# Patient Record
Sex: MALE | Race: WHITE | Employment: FULL TIME | URBAN - METROPOLITAN AREA
[De-identification: names, ages, dates, MRNs, and addresses within clinical notes are randomized per-mention and may not be internally consistent; named-entity substitution may affect disease eponyms.]

---

## 2017-02-04 ENCOUNTER — OFFICE VISIT (OUTPATIENT)
Dept: INTERNAL MEDICINE CLINIC | Facility: CLINIC | Age: 57
End: 2017-02-04

## 2017-02-04 VITALS
BODY MASS INDEX: 23.61 KG/M2 | WEIGHT: 182 LBS | HEIGHT: 73.5 IN | SYSTOLIC BLOOD PRESSURE: 118 MMHG | RESPIRATION RATE: 16 BRPM | TEMPERATURE: 98 F | HEART RATE: 78 BPM | DIASTOLIC BLOOD PRESSURE: 76 MMHG

## 2017-02-04 DIAGNOSIS — Z00.00 WELL ADULT EXAM: Primary | ICD-10-CM

## 2017-02-04 DIAGNOSIS — Z00.00 LABORATORY EXAM ORDERED AS PART OF ROUTINE GENERAL MEDICAL EXAMINATION: ICD-10-CM

## 2017-02-04 PROCEDURE — 99396 PREV VISIT EST AGE 40-64: CPT | Performed by: FAMILY MEDICINE

## 2017-02-04 NOTE — PROGRESS NOTES
HPI:    Patient ID: Heidi Pereira is a 55year old male.     HPI  Heidi Pereira is a 55year old male who presents for a complete physical exam.   HPI:       Wt Readings from Last 6 Encounters:  02/04/17 : 182 lb  10/14/15 : 179 lb  08/11/15 : 179 Alcohol Use: Yes           0.0 oz/week       0 Standard drinks or equivalent per week     . Exercise: walking.   Diet: watches minimally     REVIEW OF SYSTEMS:   GENERAL: feels well otherwise  SKIN: denies any unusual skin lesions  EYES:denies blurred these issues and agrees to the plan. The patient is asked to return for CPX in 1yr.     Review of Systems           Current Outpatient Prescriptions:  SIMVASTATIN 20 MG Oral Tab Take 1 tablet by mouth  nightly Disp: 90 tablet Rfl: 0     Allergies:No Known

## 2017-02-05 LAB
ABSOLUTE BASOPHILS: 17 CELLS/UL (ref 0–200)
ABSOLUTE EOSINOPHILS: 149 CELLS/UL (ref 15–500)
ABSOLUTE LYMPHOCYTES: 1485 CELLS/UL (ref 850–3900)
ABSOLUTE MONOCYTES: 495 CELLS/UL (ref 200–950)
ABSOLUTE NEUTROPHILS: 3355 CELLS/UL (ref 1500–7800)
ALBUMIN/GLOBULIN RATIO: 1.8 (CALC) (ref 1–2.5)
ALBUMIN: 4.4 G/DL (ref 3.6–5.1)
ALKALINE PHOSPHATASE: 39 U/L (ref 40–115)
ALT: 13 U/L (ref 9–46)
APPEARANCE: CLEAR
AST: 13 U/L (ref 10–35)
BASOPHILS: 0.3 %
BILIRUBIN, TOTAL: 0.6 MG/DL (ref 0.2–1.2)
BILIRUBIN: NEGATIVE
BUN: 12 MG/DL (ref 7–25)
CALCIUM: 9.3 MG/DL (ref 8.6–10.3)
CARBON DIOXIDE: 28 MMOL/L (ref 20–31)
CHLORIDE: 102 MMOL/L (ref 98–110)
CHOL/HDLC RATIO: 2.5 (CALC)
CHOLESTEROL, TOTAL: 174 MG/DL (ref 125–200)
COLOR: YELLOW
CREATININE: 0.92 MG/DL (ref 0.7–1.33)
EGFR IF AFRICN AM: 108 ML/MIN/1.73M2
EGFR IF NONAFRICN AM: 93 ML/MIN/1.73M2
EOSINOPHILS: 2.7 %
GLOBULIN: 2.5 G/DL (CALC) (ref 1.9–3.7)
GLUCOSE: 92 MG/DL (ref 65–99)
GLUCOSE: NEGATIVE
HDL CHOLESTEROL: 69 MG/DL
HEMATOCRIT: 42.3 % (ref 38.5–50)
HEMOGLOBIN: 14.2 G/DL (ref 13.2–17.1)
KETONES: NEGATIVE
LDL-CHOLESTEROL: 95 MG/DL (CALC)
LEUKOCYTE ESTERASE: NEGATIVE
LYMPHOCYTES: 27 %
MCH: 31.8 PG (ref 27–33)
MCHC: 33.7 G/DL (ref 32–36)
MCV: 94.4 FL (ref 80–100)
MONOCYTES: 9 %
MPV: 9.6 FL (ref 7.5–12.5)
NEUTROPHILS: 61 %
NITRITE: NEGATIVE
NON-HDL CHOLESTEROL: 105 MG/DL (CALC)
OCCULT BLOOD: NEGATIVE
PH: 7.5 (ref 5–8)
PLATELET COUNT: 190 THOUSAND/UL (ref 140–400)
POTASSIUM: 4.6 MMOL/L (ref 3.5–5.3)
PROTEIN, TOTAL: 6.9 G/DL (ref 6.1–8.1)
PROTEIN: NEGATIVE
PSA, TOTAL: 0.5 NG/ML
RDW: 12.5 % (ref 11–15)
RED BLOOD CELL COUNT: 4.47 MILLION/UL (ref 4.2–5.8)
SODIUM: 138 MMOL/L (ref 135–146)
SPECIFIC GRAVITY: 1.02 (ref 1–1.03)
TRIGLYCERIDES: 49 MG/DL
WHITE BLOOD CELL COUNT: 5.5 THOUSAND/UL (ref 3.8–10.8)

## 2017-02-16 RX ORDER — SIMVASTATIN 20 MG
TABLET ORAL
Qty: 90 TABLET | Refills: 1 | Status: SHIPPED | OUTPATIENT
Start: 2017-02-16 | End: 2017-08-11

## 2017-08-16 RX ORDER — SIMVASTATIN 20 MG
TABLET ORAL
Qty: 90 TABLET | Refills: 0 | Status: SHIPPED | OUTPATIENT
Start: 2017-08-16 | End: 2017-11-13

## 2017-11-14 RX ORDER — SIMVASTATIN 20 MG
TABLET ORAL
Qty: 90 TABLET | Refills: 0 | Status: SHIPPED | OUTPATIENT
Start: 2017-11-14 | End: 2018-05-02

## 2018-02-13 RX ORDER — SIMVASTATIN 20 MG
TABLET ORAL
Qty: 90 TABLET | Refills: 0 | OUTPATIENT
Start: 2018-02-13

## 2018-04-02 ENCOUNTER — OFFICE VISIT (OUTPATIENT)
Dept: INTERNAL MEDICINE CLINIC | Facility: CLINIC | Age: 58
End: 2018-04-02

## 2018-04-02 VITALS
WEIGHT: 184 LBS | HEART RATE: 65 BPM | OXYGEN SATURATION: 99 % | RESPIRATION RATE: 12 BRPM | HEIGHT: 73.5 IN | DIASTOLIC BLOOD PRESSURE: 80 MMHG | SYSTOLIC BLOOD PRESSURE: 126 MMHG | TEMPERATURE: 98 F | BODY MASS INDEX: 23.87 KG/M2

## 2018-04-02 DIAGNOSIS — F43.9 STRESS AT HOME: ICD-10-CM

## 2018-04-02 DIAGNOSIS — Z00.00 WELLNESS EXAMINATION: Primary | ICD-10-CM

## 2018-04-02 DIAGNOSIS — Z23 NEED FOR DIPHTHERIA-TETANUS-PERTUSSIS (TDAP) VACCINE: ICD-10-CM

## 2018-04-02 DIAGNOSIS — Z00.00 LABORATORY EXAM ORDERED AS PART OF ROUTINE GENERAL MEDICAL EXAMINATION: ICD-10-CM

## 2018-04-02 PROCEDURE — 90471 IMMUNIZATION ADMIN: CPT | Performed by: FAMILY MEDICINE

## 2018-04-02 PROCEDURE — 90715 TDAP VACCINE 7 YRS/> IM: CPT | Performed by: FAMILY MEDICINE

## 2018-04-02 PROCEDURE — 99396 PREV VISIT EST AGE 40-64: CPT | Performed by: FAMILY MEDICINE

## 2018-04-02 NOTE — PROGRESS NOTES
HPI:    Patient ID: Meera Castellanos is a 65year old male.     HPI  Meera Castellanos is a 65year old male who presents for a complete physical exam.   HPI:       Wt Readings from Last 6 Encounters:  04/02/18 : 184 lb  02/04/17 : 182 lb  10/14/15 : 179 Alcohol use: Yes           0.0 oz/week     . Exercise: 1 times per week.   Diet: watches calories closely     REVIEW OF SYSTEMS:   GENERAL: feels well otherwise  SKIN: seeing Dr Zachary regalado  EYES:denies blurred vision or double vision  HEENT: denies na aerobic exercise 30 minutes three times weekly. Health maintenance, will check fasting Lipids, CMP, CBC PSA, HCV  and UA. Will refer to [facility] for counseling    boostrix today    The patient indicates understanding of these issues and agrees to the plan.

## 2018-04-19 ENCOUNTER — TELEPHONE (OUTPATIENT)
Dept: INTERNAL MEDICINE CLINIC | Facility: CLINIC | Age: 58
End: 2018-04-19

## 2018-04-19 NOTE — TELEPHONE ENCOUNTER
Pt was at his employers [facility] today and they were unable to remove metal object ,He will return to them on Monday to see if it works itself out .  Pt advised to watch for s/s of infection and our uc/er  as a option as needed over the weekend

## 2018-04-19 NOTE — TELEPHONE ENCOUNTER
Patient was injured at work, went to his work urgent care and was told he has a piece of metal in his hand and would like to know if he should come see Dr. Agnieszka López because they werent able to get it out

## 2018-05-02 RX ORDER — SIMVASTATIN 20 MG
TABLET ORAL
Qty: 90 TABLET | Refills: 1 | Status: SHIPPED | OUTPATIENT
Start: 2018-05-02 | End: 2018-05-02 | Stop reason: CLARIF

## 2018-05-02 RX ORDER — SIMVASTATIN 20 MG
TABLET ORAL
Qty: 90 TABLET | Refills: 0 | Status: CANCELLED | OUTPATIENT
Start: 2018-05-02

## 2018-05-02 RX ORDER — SIMVASTATIN 20 MG
TABLET ORAL
Qty: 90 TABLET | Refills: 1 | Status: SHIPPED | OUTPATIENT
Start: 2018-05-02 | End: 2018-10-28

## 2018-10-30 RX ORDER — SIMVASTATIN 20 MG
TABLET ORAL
Qty: 90 TABLET | Refills: 1 | Status: SHIPPED | OUTPATIENT
Start: 2018-10-30 | End: 2019-04-28

## 2019-04-30 RX ORDER — SIMVASTATIN 20 MG
TABLET ORAL
Qty: 30 TABLET | Refills: 0 | Status: SHIPPED | OUTPATIENT
Start: 2019-04-30 | End: 2019-10-08

## 2019-05-04 ENCOUNTER — OFFICE VISIT (OUTPATIENT)
Dept: INTERNAL MEDICINE CLINIC | Facility: CLINIC | Age: 59
End: 2019-05-04
Payer: COMMERCIAL

## 2019-05-04 VITALS
TEMPERATURE: 98 F | DIASTOLIC BLOOD PRESSURE: 84 MMHG | HEIGHT: 73.75 IN | WEIGHT: 178 LBS | BODY MASS INDEX: 23.09 KG/M2 | RESPIRATION RATE: 16 BRPM | HEART RATE: 78 BPM | SYSTOLIC BLOOD PRESSURE: 122 MMHG | OXYGEN SATURATION: 99 %

## 2019-05-04 DIAGNOSIS — Z00.00 LABORATORY EXAM ORDERED AS PART OF ROUTINE GENERAL MEDICAL EXAMINATION: ICD-10-CM

## 2019-05-04 DIAGNOSIS — Z77.21 EXPOSURE TO BLOOD: ICD-10-CM

## 2019-05-04 DIAGNOSIS — Z00.00 WELLNESS EXAMINATION: Primary | ICD-10-CM

## 2019-05-04 PROCEDURE — 84153 ASSAY OF PSA TOTAL: CPT | Performed by: FAMILY MEDICINE

## 2019-05-04 PROCEDURE — 85025 COMPLETE CBC W/AUTO DIFF WBC: CPT | Performed by: FAMILY MEDICINE

## 2019-05-04 PROCEDURE — 87389 HIV-1 AG W/HIV-1&-2 AB AG IA: CPT | Performed by: FAMILY MEDICINE

## 2019-05-04 PROCEDURE — 80053 COMPREHEN METABOLIC PANEL: CPT | Performed by: FAMILY MEDICINE

## 2019-05-04 PROCEDURE — 80061 LIPID PANEL: CPT | Performed by: FAMILY MEDICINE

## 2019-05-04 PROCEDURE — 99396 PREV VISIT EST AGE 40-64: CPT | Performed by: FAMILY MEDICINE

## 2019-05-04 NOTE — PROGRESS NOTES
HPI:    Patient ID: Dorothy Griggs is a 63year old male.     HPI  Dorothy Griggs is a 63year old male who presents for a complete physical exam.   HPI:       Wt Readings from Last 6 Encounters:  05/04/19 : 178 lb  04/02/18 : 184 lb  02/04/17 : 182 Wondering about HIV exposure   SKIN: denies rash  HEENT: denies nasal congestion, sinus pain or ST  Using mouthguard for R TMJ s/s  Does grind teeth    LUNGS: denies shortness of breath  CARDIOVASCULAR: denies chest pain on exertion  GI: denies abdominal p Encounter      CBC W Differential W Platelet [E]      Comp Metabolic Panel (14)      Lipid Panel [E]      Urinalysis, Routine [E]      PSA (Screening) [E]      HIV AG AB Combo [E]      Meds This Visit:  Requested Prescriptions      No prescriptions request

## 2019-10-07 ENCOUNTER — TELEPHONE (OUTPATIENT)
Dept: INTERNAL MEDICINE CLINIC | Facility: CLINIC | Age: 59
End: 2019-10-07

## 2019-10-08 RX ORDER — SIMVASTATIN 20 MG
TABLET ORAL
Qty: 90 TABLET | Refills: 0 | Status: SHIPPED | OUTPATIENT
Start: 2019-10-08 | End: 2019-10-31

## 2019-10-31 RX ORDER — SIMVASTATIN 20 MG
TABLET ORAL
Qty: 90 TABLET | Refills: 0 | Status: SHIPPED | OUTPATIENT
Start: 2019-10-31

## 2019-11-11 ENCOUNTER — TELEPHONE (OUTPATIENT)
Dept: INTERNAL MEDICINE CLINIC | Facility: CLINIC | Age: 59
End: 2019-11-11

## 2019-11-11 NOTE — TELEPHONE ENCOUNTER
Pt is dropping FMLA ppw from work([employer]) to be filled out to take care of his son Chari Arauz

## 2019-11-13 NOTE — TELEPHONE ENCOUNTER
Araceli Monroe came in for his [facility] paperwork; he is moving his son Doug [place] out of state to live independently. Per MA/Margaux will look into with Dr Lilliam Rios; please request it to be completed asap as he needs it for his work to be off to move son.   Call Cell Phone

## 2019-11-14 NOTE — TELEPHONE ENCOUNTER
Called patient and set up an upcoming appointment to complete paperwork per doctor's request; have paperwork to Thania Espinoza at pod to keep for this review

## 2019-11-14 NOTE — TELEPHONE ENCOUNTER
Forms located and given to Sherri Silva (front Staff) to call patient and make appointment for paperwork to be filled out.

## 2019-11-25 ENCOUNTER — OFFICE VISIT (OUTPATIENT)
Dept: INTERNAL MEDICINE CLINIC | Facility: CLINIC | Age: 59
End: 2019-11-25
Payer: COMMERCIAL

## 2019-11-25 VITALS
WEIGHT: 188 LBS | DIASTOLIC BLOOD PRESSURE: 78 MMHG | BODY MASS INDEX: 24.39 KG/M2 | TEMPERATURE: 98 F | OXYGEN SATURATION: 98 % | HEIGHT: 73.5 IN | SYSTOLIC BLOOD PRESSURE: 135 MMHG | RESPIRATION RATE: 16 BRPM | HEART RATE: 70 BPM

## 2019-11-25 DIAGNOSIS — Z02.89 ENCOUNTER FOR COMPLETION OF FORM WITH PATIENT: Primary | ICD-10-CM

## 2019-11-25 DIAGNOSIS — I35.9 AORTIC VALVE DISORDER: ICD-10-CM

## 2019-11-25 PROCEDURE — 99213 OFFICE O/P EST LOW 20 MIN: CPT | Performed by: FAMILY MEDICINE

## 2019-11-26 NOTE — PROGRESS NOTES
HPI:    Patient ID: Mary Kate Sands is a 63year old male.     HPI  Here for [place] form   Sharon Dias is relocating to [facility]) to [facility] another relative   Has autism and will need assistance during the move and possible in the future if needed  , hence the for

## 2020-02-24 ENCOUNTER — TELEPHONE (OUTPATIENT)
Dept: INTERNAL MEDICINE CLINIC | Facility: CLINIC | Age: 60
End: 2020-02-24

## 2020-02-24 DIAGNOSIS — Z12.11 SCREEN FOR COLON CANCER: Primary | ICD-10-CM

## 2020-02-24 NOTE — TELEPHONE ENCOUNTER
REFERRAL   Received:  Today   Message Contents   Elissa Resendez [facility] Clinical Staff   Cc: [facility] Referral Pool   Phone Number: [phone]             .Reason for the order/referral:GASTROENTEROLOGY/COLONOSCOPY   PCP: Yeyo Hoff   Refer to Provide

## 2020-02-25 ENCOUNTER — TELEPHONE (OUTPATIENT)
Dept: INTERNAL MEDICINE CLINIC | Facility: CLINIC | Age: 60
End: 2020-02-25

## 2020-02-25 NOTE — TELEPHONE ENCOUNTER
Patient calling to request last colonoscopy be faxed to    [facility] [place]       Fax # provided by patient is: [phone]

## (undated) NOTE — LETTER
12/27/19        [address]  [address] [facility] [address]      Dear Elder Harp records indicate that you have outstanding lab work and or testing that was ordered for you and has not yet been completed:   Cardio Echo Doppler

## (undated) NOTE — LETTER
April 18, 2018    [address] [place] [address] [address] [address]      Dear Letha Escobar:    The following are the results of your recent tests. Please review the list of test results.   Your result is the value on the left; we have also supp ABSOLUTE MONOCYTES 610 200 - 950 cells/uL   ABSOLUTE EOSINOPHILS 91 15 - 500 cells/uL   ABSOLUTE BASOPHILS 51 0 - 200 cells/uL   NEUTROPHILS 59.2 %   LYMPHOCYTES 27.6 %   MONOCYTES 10.7 %   EOSINOPHILS 1.6 %   BASOPHILS 0.9 %   -PSA   Result Value Ref Rang

## (undated) NOTE — MR AVS SNAPSHOT
[place]  [address] [facility] [address]  [address] [facility] [address] [phone]               Thank you for choosing us for your health care visit with Aline Thomas MD.  We are glad to serve you and happy to provide you with this sum routine general medical examination [Z00.00]           Urinalysis, Routine    Complete by:   Feb 04, 2017    Assoc Dx:  Laboratory exam ordered as part of routine general medical examination [Z00.00]                 MyChart     Sign up for MyChart, your sec